# Patient Record
Sex: MALE | Race: OTHER | NOT HISPANIC OR LATINO | URBAN - METROPOLITAN AREA
[De-identification: names, ages, dates, MRNs, and addresses within clinical notes are randomized per-mention and may not be internally consistent; named-entity substitution may affect disease eponyms.]

---

## 2021-01-24 ENCOUNTER — EMERGENCY (EMERGENCY)
Facility: HOSPITAL | Age: 56
LOS: 1 days | Discharge: HOME | End: 2021-01-24
Attending: EMERGENCY MEDICINE
Payer: MEDICAID

## 2021-01-24 VITALS
OXYGEN SATURATION: 98 % | SYSTOLIC BLOOD PRESSURE: 151 MMHG | RESPIRATION RATE: 16 BRPM | DIASTOLIC BLOOD PRESSURE: 81 MMHG | TEMPERATURE: 98 F | HEART RATE: 83 BPM

## 2021-01-24 VITALS
DIASTOLIC BLOOD PRESSURE: 87 MMHG | TEMPERATURE: 98 F | HEART RATE: 84 BPM | WEIGHT: 139.99 LBS | OXYGEN SATURATION: 99 % | RESPIRATION RATE: 16 BRPM | SYSTOLIC BLOOD PRESSURE: 157 MMHG

## 2021-01-24 PROCEDURE — 99283 EMERGENCY DEPT VISIT LOW MDM: CPT

## 2021-01-24 RX ORDER — DEXAMETHASONE 0.5 MG/5ML
10 ELIXIR ORAL ONCE
Refills: 0 | Status: COMPLETED | OUTPATIENT
Start: 2021-01-24 | End: 2021-01-24

## 2021-01-24 RX ADMIN — Medication 10 MILLIGRAM(S): at 19:43

## 2021-01-24 NOTE — ED PROVIDER NOTE - DATE/TIME 1
-- DO NOT REPLY / DO NOT REPLY ALL --  -- Message is from the [facility]--    COVID-19 Universal Screening: Negative    General Patient Message      Reason for Call: please call mom to let her know if and when she can drop off paperwork for the patients no fee parking paperwork to be filled out or if she can scan the paperwork over    Caller Information       Type Contact Phone    04/07/2020 03:42 PM Phone (Incoming) Amy Cerda (Mother) [phone]          Alternative phone number: none    Turnaround time given to caller:   \"This message will be sent to [state Provider's name]. The clinical team will fulfill your request as soon as they review your message.\"     24-Jan-2021 19:37

## 2021-01-24 NOTE — ED PROVIDER NOTE - PROGRESS NOTE DETAILS
PGY 1 Earl Dudley: Pt given dose of 10mg decadron. Plan for d/c home w/ f/u w/ his otolaryngologist. Plan discussed with pt and he understands. Instructions to continue abx. Given return precautions and pt understands.

## 2021-01-24 NOTE — ED ADULT NURSE NOTE - OBJECTIVE STATEMENT
56 y/o M A&Ox3 denies PMH/PSH presents to the ED from home c/o throat pain. Pt reports L sided throat pain began several days ago. Pt was seen at PCP office and then in ENT office. Pt was prescribed unknown antibiotic, pt states he took 2 does of antibiotic. ENT advised pt to be seen in the ED for steroid treatment. Pt states the pain and swelling is better today than yesterday. Upon arrival to ED pt is well appearing. Breathing is even and unlabored, satting high 90s RA. Skin is warm, dry & in tact. Throat is not tender to palpation. Upon assessment left side of pt throat appears swollen. Pt denies CP, HA, SOB, vision changes, numbness, tingling, fever, chills. Call bell within reach, comfort & safety provided.

## 2021-01-24 NOTE — ED ADULT TRIAGE NOTE - CHIEF COMPLAINT QUOTE
sent by PMD for "shots" for throat swelling and pain "at the back of the tonsils" / difficulty swallowing

## 2021-01-24 NOTE — ED PROVIDER NOTE - PATIENT PORTAL LINK FT
You can access the FollowMyHealth Patient Portal offered by [facility] by registering at the following website: [email or web address]. By joining [employer]’s FollowMyHealth portal, you will also be able to view your health information using other applications (apps) compatible with our system.

## 2021-01-24 NOTE — ED PROVIDER NOTE - OBJECTIVE STATEMENT
Attn - pt seen in [facility] - hx via  - pt sent to ER by ENT for steroid injection.  pt has infection on left posterior pharynx and started on Abx q 12hrs presumed to be Augmentin.  pt states pain started around left lower molar and then to pharynx 3 days ago.  Took two doses of Abx - better now than this am.  no fever, diff breathing or swallowing.

## 2021-01-24 NOTE — ED PROVIDER NOTE - NSFOLLOWUPINSTRUCTIONS_ED_ALL_ED_FT
1. You presented to the emergency department for:  swelling in your throat.    2. Your evaluation in the emergency department included a physician evaluation. This exam did not reveal any findings indicating the need for admission to the hospital or an emergent intervention at this time.    3. It is recommended that you follow-up with your otolaryngologist, Dr. Ayden Najera, tomorrow as discussed for a repeat evaluation, and potentially further testing and treatment. The phone number is [phone] for their office.    If needed, to arrange an appointment with a primary care provider please call: [phone]    4. Please continue taking your regular medications as prescribed.     You can use 400-600mg Ibuprofen (such as motrin or advil) every 6 to 8 hours as needed for pain control.  Take ibuprofen with food or milk to lessen stomach upset.  This is an over-the-counter medication please respect the warnings on the label. All medications come with certain risks and side effects that you need to discuss with your doctor, especially if you are taking them for a prolonged period (beyond 3-4 days).    5. PLEASE RETURN TO THE EMERGENCY DEPARTMENT IMMEDIATELY IF you have any fevers, increasing difficulty breathing, inability swallow, severe increase in your pain, uncontrollable nausea and vomiting, lightheadedness, inability to tolerate eating and drinking, difficulty breathing, severe increase in symptoms or pain, or an other new symptoms that concern you.

## 2021-01-24 NOTE — ED PROVIDER NOTE - PHYSICAL EXAMINATION
Attn - alert, nad, no facial swelling, tenderness left submandibular area.  no sublingual swelling or submental tenderness, +trismus, no adenopathy.  OP - pericoronitis on left lower molar, and erythema and sl. swelling left soft palate.  no bulging or shift of uvula.  normal phonation.  no stridor.

## 2021-01-24 NOTE — ED PROVIDER NOTE - CLINICAL SUMMARY MEDICAL DECISION MAKING FREE TEXT BOX
Attn - left lower pericoronitis with infection to left pharynx.  improved after two doses of Abx - airway not compromised.  decadron and continue Abx.  f/u with ENT or Dental